# Patient Record
Sex: FEMALE | Race: BLACK OR AFRICAN AMERICAN
[De-identification: names, ages, dates, MRNs, and addresses within clinical notes are randomized per-mention and may not be internally consistent; named-entity substitution may affect disease eponyms.]

---

## 2018-08-10 ENCOUNTER — HOSPITAL ENCOUNTER (EMERGENCY)
Dept: HOSPITAL 62 - ER | Age: 43
Discharge: HOME | End: 2018-08-10
Payer: SELF-PAY

## 2018-08-10 VITALS — SYSTOLIC BLOOD PRESSURE: 136 MMHG | DIASTOLIC BLOOD PRESSURE: 102 MMHG

## 2018-08-10 DIAGNOSIS — R60.0: ICD-10-CM

## 2018-08-10 DIAGNOSIS — F17.200: ICD-10-CM

## 2018-08-10 DIAGNOSIS — R50.9: ICD-10-CM

## 2018-08-10 DIAGNOSIS — J44.9: ICD-10-CM

## 2018-08-10 DIAGNOSIS — L03.119: Primary | ICD-10-CM

## 2018-08-10 LAB
ADD MANUAL DIFF: NO
ALBUMIN SERPL-MCNC: 4.3 G/DL (ref 3.5–5)
ALP SERPL-CCNC: 89 U/L (ref 38–126)
ALT SERPL-CCNC: 11 U/L (ref 9–52)
ANION GAP SERPL CALC-SCNC: 10 MMOL/L (ref 5–19)
AST SERPL-CCNC: 17 U/L (ref 14–36)
BASOPHILS # BLD AUTO: 0.1 10^3/UL (ref 0–0.2)
BASOPHILS NFR BLD AUTO: 0.7 % (ref 0–2)
BILIRUB DIRECT SERPL-MCNC: 0.4 MG/DL (ref 0–0.4)
BILIRUB SERPL-MCNC: 0.6 MG/DL (ref 0.2–1.3)
BUN SERPL-MCNC: 9 MG/DL (ref 7–20)
CALCIUM: 9.5 MG/DL (ref 8.4–10.2)
CHLORIDE SERPL-SCNC: 105 MMOL/L (ref 98–107)
CO2 SERPL-SCNC: 27 MMOL/L (ref 22–30)
EOSINOPHIL # BLD AUTO: 0.1 10^3/UL (ref 0–0.6)
EOSINOPHIL NFR BLD AUTO: 1.6 % (ref 0–6)
ERYTHROCYTE [DISTWIDTH] IN BLOOD BY AUTOMATED COUNT: 13.3 % (ref 11.5–14)
GLUCOSE SERPL-MCNC: 80 MG/DL (ref 75–110)
HCT VFR BLD CALC: 38.4 % (ref 36–47)
HGB BLD-MCNC: 13.5 G/DL (ref 12–15.5)
LYMPHOCYTES # BLD AUTO: 1.7 10^3/UL (ref 0.5–4.7)
LYMPHOCYTES NFR BLD AUTO: 22.1 % (ref 13–45)
MCH RBC QN AUTO: 35.8 PG (ref 27–33.4)
MCHC RBC AUTO-ENTMCNC: 35.1 G/DL (ref 32–36)
MCV RBC AUTO: 102 FL (ref 80–97)
MONOCYTES # BLD AUTO: 0.8 10^3/UL (ref 0.1–1.4)
MONOCYTES NFR BLD AUTO: 10.5 % (ref 3–13)
NEUTROPHILS # BLD AUTO: 5 10^3/UL (ref 1.7–8.2)
NEUTS SEG NFR BLD AUTO: 65.1 % (ref 42–78)
PLATELET # BLD: 333 10^3/UL (ref 150–450)
POTASSIUM SERPL-SCNC: 3.6 MMOL/L (ref 3.6–5)
PROT SERPL-MCNC: 7.4 G/DL (ref 6.3–8.2)
RBC # BLD AUTO: 3.77 10^6/UL (ref 3.72–5.28)
SODIUM SERPL-SCNC: 141.6 MMOL/L (ref 137–145)
TOTAL CELLS COUNTED % (AUTO): 100 %
WBC # BLD AUTO: 7.8 10^3/UL (ref 4–10.5)

## 2018-08-10 PROCEDURE — 85025 COMPLETE CBC W/AUTO DIFF WBC: CPT

## 2018-08-10 PROCEDURE — 99284 EMERGENCY DEPT VISIT MOD MDM: CPT

## 2018-08-10 PROCEDURE — 80053 COMPREHEN METABOLIC PANEL: CPT

## 2018-08-10 PROCEDURE — 36415 COLL VENOUS BLD VENIPUNCTURE: CPT

## 2018-08-10 PROCEDURE — 93970 EXTREMITY STUDY: CPT

## 2018-08-10 NOTE — ER DOCUMENT REPORT
ED General





- General


Chief Complaint: Skin Sore(s)


Stated Complaint: LEG PAIN


Time Seen by Provider: 08/10/18 18:22


Mode of Arrival: Ambulatory


Information source: Patient


TRAVEL OUTSIDE OF THE [place] IN LAST 30 DAYS: No





- HPI


Notes: 





Patient is a 42-year-old with history of COPD presents to the emergency 

department that she recently was on a bus for over 40 hours coming from [place] 

and presents with report of lower extremity swelling and skin redness and pain 

that she is noticed for the last 2 days associated with low-grade fever.  She 

is unaware of any specific insect bites or other trauma.  The patient reports 

no cough or congestion or abdominal pain or nausea or vomiting or numbness or 

paresthesia or other injury.  No specific joint pain.  No previous episodes or 

injuries. She denies any itching to the skin wounds.





Past Medical History





- General


Information source: Patient





- Social History


Smoking Status: Current Every Day Smoker


Chew tobacco use (# tins/day): No


Frequency of alcohol use: None


Drug Abuse: Marijuana


Lives with: Family


Family History: Reviewed & Not Pertinent


Patient has suicidal ideation: No


Patient has homicidal ideation: No


Renal/ Medical History: Denies: Hx Peritoneal Dialysis


Past Surgical History: Reports: Hx  Section - 2





Review of Systems





- Review of Systems


-: Yes All other systems reviewed and negative





Physical Exam





- Vital signs


Vitals: 


 











Temp Pulse Resp BP Pulse Ox


 


 100.0 F   86   16   156/96 H  100 


 


 08/10/18 16:01  08/10/18 16:01  08/10/18 16:01  08/10/18 16:01  08/10/18 16:01














- Notes


Notes: 





PHYSICAL EXAMINATION:





GENERAL: Well-appearing, well-nourished and in no acute distress.





HEAD: Atraumatic, normocephalic.





EYES: Pupils equal round and reactive to light, extraocular movements intact, 

conjunctiva are normal.





ENT: Nares patent, oropharynx clear without exudates.  Moist mucous membranes.





NECK: Normal range of motion, supple without lymphadenopathy





LUNGS: Breath sounds clear to auscultation bilaterally and equal.  No wheezes 

rales or rhonchi.





HEART: Regular rate and rhythm without murmurs





ABDOMEN: Soft, nontender, nondistended abdomen.  No guarding, no rebound.  No 

masses appreciated.





Female : deferred





Musculoskeletal: Normal range of motion.  No cyanosis.  No evidence for septic 

arthritis.  Patient has trace bilateral lower extremity edema.  Negative 

Homans.  No palpable cord.





NEUROLOGICAL: Cranial nerves grossly intact.  Normal speech, normal gait.  

Normal sensory, motor exams





PSYCH: Normal mood, normal affect.





SKIN: Warm, Dry, normal turgor, no rashes or lesions noted.  Small erythematous 

areas that would fit for maculopapular insect bites right greater than left 

lower extremity with cellulitic appearance.  No evidence for abscess.  No 

proximal erythema or adenopathy.  Distally, the patient has good pulses and 

sensation and capillary refill.





Course





- Re-evaluation


Re-evalutation: 





08/10/18 23:32


Patient was given ibuprofen with significant improvement in her discomfort.  

She was ambulatory without complaint.  She was given clindamycin and Keflex by 

mouth for infection coverage.  The patient had a negative ultrasound of her 

legs.  The patient had otherwise normal blood work without evidence for sepsis 

or diabetes or renal insufficiency.





Question insect bites as an etiology.  The patient has negative Osler nodes 

negative for Janeway lesions.  She has no obvious murmur on cardiac exam.  

There is no evidence for systemic infection or sepsis.





- Vital Signs


Vital signs: 


 











Temp Pulse Resp BP Pulse Ox


 


 99.0 F   84   16   136/102 H  99 


 


 08/10/18 21:55  08/10/18 21:55  08/10/18 21:55  08/10/18 21:55  08/10/18 21:55














- Laboratory


Result Diagrams: 


 08/10/18 19:49





 08/10/18 19:49


Laboratory results interpreted by me: 


 











  08/10/18





  19:49


 


MCV  102 H


 


MCH  35.8 H














Discharge





- Discharge


Clinical Impression: 


Cellulitis


Qualifiers:


 Site of cellulitis: extremity Site of cellulitis of extremity: lower extremity 

Laterality: unspecified laterality Qualified Code(s): L03.119 - Cellulitis of 

unspecified part of limb





Fever


Qualifiers:


 Fever type: unspecified Qualified Code(s): R50.9 - Fever, unspecified





Condition: Stable


Disposition: HOME, SELF-CARE


Instructions:  Cellulitis (Critical access hospital), Family Physicians / Practices


Additional Instructions: 


Return to the emergency department in case of high fever, worsening swelling or 

joint pain or worsening rash.


Prescriptions: 


Ibuprofen 800 mg PO Q8HP PRN #30 tablet


 PRN Reason: 


Cephalexin Monohydrate [Keflex 500 mg Capsule] 500 mg PO TID 10 Days  capsule


Clindamycin HCl 300 mg PO TID #30 capsule


Forms:  Return to Work

## 2018-08-12 NOTE — XCELERA REPORT
[address]

                             [facility] [address]

                               Tel: [phone]

                               Fax: [phone]



                       Lower Extremity Venous Evaluation

_______________________________________________________________________________



_______________________________________________________________________________

Procedure: Color flow and duplex imaging bilaterally of the veins of the lower

extremities as well as the Common Femoral veins.





_______________________________________________________________________________



_______________________________________________________________________________



Right Sided Venous Evaluation

Normal vessel filling wall to wall, compression and augmentation as well as

Colour flow down to the infrageniculate veins.



Left Sided Venous Evaluation

Normal vessel filling wall to wall, compression and augmentation as well as

Colour flow down to the infrageniculate veins.



_______________________________________________________________________________



Interpretation Summary

No duplex evidence of DVT or obstruction in the bilateral lower extremities.

_______________________________________________________________________________



Name: ELAYNE QUIÑONEZ

MRN: [id number]

Age: 42 yrs

Gender: Female                                       : 1975

Patient Status: Preadmit                             Patient Location: ER

Account #: [id number]

Study Date: 08/10/2018 07:28 PM

                        Accession #: [id number]

Reason For Study: Bilat LE swelling, recent long-distance travel

Ordering Physician: WALTER WADE

Performed By: Ricarda Michael

Electronically signed by:      Lennox Williams      on 2018 03:55 PM



CC: WALTER WADE

>

Williams, Lennox